# Patient Record
Sex: FEMALE | Race: BLACK OR AFRICAN AMERICAN | NOT HISPANIC OR LATINO | Employment: FULL TIME | URBAN - METROPOLITAN AREA
[De-identification: names, ages, dates, MRNs, and addresses within clinical notes are randomized per-mention and may not be internally consistent; named-entity substitution may affect disease eponyms.]

---

## 2023-07-08 ENCOUNTER — OFFICE VISIT (OUTPATIENT)
Dept: URGENT CARE | Facility: URGENT CARE | Age: 33
End: 2023-07-08
Payer: COMMERCIAL

## 2023-07-08 VITALS
OXYGEN SATURATION: 98 % | DIASTOLIC BLOOD PRESSURE: 71 MMHG | RESPIRATION RATE: 12 BRPM | HEART RATE: 85 BPM | TEMPERATURE: 98.3 F | SYSTOLIC BLOOD PRESSURE: 106 MMHG | WEIGHT: 184.4 LBS | BODY MASS INDEX: 33.19 KG/M2

## 2023-07-08 DIAGNOSIS — M89.8X8 MASS OF SKULL: ICD-10-CM

## 2023-07-08 DIAGNOSIS — J02.9 ACUTE PHARYNGITIS, UNSPECIFIED ETIOLOGY: Primary | ICD-10-CM

## 2023-07-08 DIAGNOSIS — J30.2 SEASONAL ALLERGIC RHINITIS, UNSPECIFIED TRIGGER: ICD-10-CM

## 2023-07-08 LAB
DEPRECATED S PYO AG THROAT QL EIA: NEGATIVE
GROUP A STREP BY PCR: NOT DETECTED

## 2023-07-08 PROCEDURE — 87651 STREP A DNA AMP PROBE: CPT | Performed by: PHYSICIAN ASSISTANT

## 2023-07-08 PROCEDURE — 99203 OFFICE O/P NEW LOW 30 MIN: CPT | Performed by: PHYSICIAN ASSISTANT

## 2023-07-08 RX ORDER — CETIRIZINE HYDROCHLORIDE 10 MG/1
10 TABLET ORAL DAILY
Qty: 90 TABLET | Refills: 0 | Status: SHIPPED | OUTPATIENT
Start: 2023-07-08

## 2023-07-08 ASSESSMENT — PAIN SCALES - GENERAL: PAINLEVEL: NO PAIN (0)

## 2023-07-08 NOTE — PROGRESS NOTES
Assessment & Plan     Acute pharyngitis, unspecified etiology  - Streptococcus A Rapid Screen w/Reflex to PCR - Clinic Collect  - Group A Streptococcus PCR Throat Swab    Seasonal allergic rhinitis, unspecified trigger  - cetirizine (ZYRTEC) 10 MG tablet; Take 1 tablet (10 mg) by mouth daily    Mass of skull  This is felt to be a bony mass, possibly a normal variation of the skull. I recommend follow up with primary care provider for further evaluation to consider imaging given recent tenderness in these masses.    Return in about 1 week (around 7/15/2023) for visit with primary care provider if not improving.     Yohana Peterson PA-C  [facility]    Subjective   Teresa Ray is a 33 year old who presents for the following health issues     Patient presents with:  Pharyngitis: On Monday, sore throat, headache, slight cough, having wheezing, some lumps on the top of the head    JUANITO Moore presents clinic today for evaluation of a sore throat and a cough.  Symptoms first began 5 days ago.  The sore throat has been improving as she has been drinking hot tea.  Cough began yesterday and feels that it is coming from a tickle in her throat versus deep in her chest.  No fever.    She also notes that she has lumps on her head.  These have been present for several years but are now becoming tender to the touch.  She states that sometimes she gets a headache and presses on these lumps and the pain improves.    Review of Systems   ROS negative except as stated above.      Objective    /71   Pulse 85   Temp 98.3  F (36.8  C) (Tympanic)   Resp 12   Wt 83.6 kg (184 lb 6.4 oz)   LMP 07/04/2023   SpO2 98%   BMI 33.19 kg/m    Physical Exam   GENERAL: healthy, alert and no distress  EYES: Eyes grossly normal to inspection, PERRL and conjunctivae and sclerae normal  HENT: ear canals and TM's normal, nose with slightly edematous and erythematous mucosa bilaterally, and mouth without ulcers or  lesions, posterior pharynx erythematous with 3+ tonsillar hypertrophy suspected to be baseline enlargement  NECK: no adenopathy, no asymmetry, masses, or scars and thyroid normal to palpation  RESP: lungs clear to auscultation - no rales, rhonchi or wheezes  CV: regular rate and rhythm, normal S1 S2, no S3 or S4, no murmur, click or rub, no peripheral edema and peripheral pulses strong  SKIN: no suspicious lesions or rashes. Hard subcutaneous lump noted on scalp. Non mobile, smooth.    Results for orders placed or performed in visit on 07/08/23   Streptococcus A Rapid Screen w/Reflex to PCR - Clinic Collect     Status: Normal    Specimen: Throat; Swab   Result Value Ref Range    Group A Strep antigen Negative Negative

## 2023-07-08 NOTE — PATIENT INSTRUCTIONS
"Rapid strep negative.  Take an antihistamine such as Claritin (loratadine), Zyrtec (cetirizine) or Allegra (fexofenadine) daily for allergy symptoms.  Drink plenty of fluids and rest.  May use salt water gargles- about 8 oz warm water with about 1 teaspoon salt  Over the counter pain relievers such as Tylenol or ibuprofen may be used as needed.   Honey lemon tea helps to soothe the throat. \"Throat Coat\" tea is soothing as well.  Please follow up with primary care provider if not improving, worsening or new symptoms.    "

## 2025-02-18 ENCOUNTER — VIRTUAL VISIT (OUTPATIENT)
Dept: UROLOGY | Facility: CLINIC | Age: 35
End: 2025-02-18
Attending: OBSTETRICS & GYNECOLOGY
Payer: COMMERCIAL

## 2025-02-18 VITALS — HEIGHT: 63 IN | WEIGHT: 181 LBS | BODY MASS INDEX: 32.07 KG/M2

## 2025-02-18 DIAGNOSIS — N39.41 URGE INCONTINENCE: Primary | ICD-10-CM

## 2025-02-18 RX ORDER — TERBINAFINE HYDROCHLORIDE 250 MG/1
1 TABLET ORAL DAILY
COMMUNITY

## 2025-02-18 ASSESSMENT — PAIN SCALES - GENERAL: PAINLEVEL_OUTOF10: NO PAIN (0)

## 2025-02-18 NOTE — PROGRESS NOTES
"Virtual Visit Details    Type of service:  Video Visit   Video Start Time: 4:39 PM  Video End Time: 5:05 pm    Originating Location (pt. Location): Home    Distant Location (provider location):  On-site  Platform used for Video Visit: [place]        Chief complaint: urge incontinence    Subjective     Teresa Ray is a 34 year old  who presents for a video visit today for urge urinary incontinence. She says it started after she had a short period of difficulty emptying her bladder for several hours back in October after she was having some severe upper respiratory tract infection vs spasms with shortness of breath ( needed nebulizer/inhalors). She was evaluated in the ED back in the  of  for upper respiratory symptoms and possible urinary retention. Abdominal US was done on one of her ED visits on 10.28.24 which showed distended bladder but I am unable to tell if this was after attempting to void and per ED notes \"you are able to empty your bladder\".  Pelvic US on 10.19.24 appeared normal.  She says she was able to void after several hours in the ED and has not had any issues since.  However now, she  has has been having small leaks with urgency occasionally. She voids every 2-3 hours during the day and none at night. No dysurea or gross hematuria. No stress incontinence    Pelvic US 10.19.24    FINDINGS:    Uterus: Measures 9.2 x 6.0 x 6.5 cm. Uterine Volume: 186.58 ml. No evidence of a fibroid or other focal uterine lesion.   Endometrium: Measures up to 0.6 cm in thickness.    Right Ovary: Measures 4.3 x 1.8 x 3.7 cm and appears unremarkable   Right Ovary Blood Flow: Present.   Left Ovary: Measures 2.6 x 1.4 x 2.7 cm and appears unremarkable   Left Ovary Blood Flow: Present.   Free Fluid: no significant free fluid.       Prolapse symptoms  Denies vaginal bulge, pressure sensation or protrusion.      GI symptoms  Denies chronic diarrhea, constipation. Denies bothersome fecal or flatal incontinence. " Denies defecatory difficulties.       Sexual health/Pelvic floor  Sexually active. No pain . Regular menses    Relevant Medical History:    Diabetes? no  High Blood pressure? no     Recurrent UTIs? no  Sleep Apnea? no  Obesity? Body mass index is 32.06 kg/m .  History of Blood clots? no  Other medical problems: none    Surgical History:      Past Surgical History:   Procedure Laterality Date    GYN SURGERY         OB/Gyn History:  OB History   No obstetric history on file.       Medications/Vitamins/Supplements:   Current Outpatient Medications   Medication Sig Dispense Refill    cetirizine (ZYRTEC) 10 MG tablet Take 1 tablet (10 mg) by mouth daily (Patient not taking: Reported on 2/18/2025) 90 tablet 0    terbinafine (LAMISIL) 250 MG tablet Take 1 tablet by mouth daily.       No current facility-administered medications for this visit.         Medical History:      No past medical history on file.  ROS  Social History    Social History     Socioeconomic History    Marital status: Single     Spouse name: Not on file    Number of children: Not on file    Years of education: Not on file    Highest education level: Not on file   Occupational History    Not on file   Tobacco Use    Smoking status: Never    Smokeless tobacco: Never   Vaping Use    Vaping status: Never Used   Substance and Sexual Activity    Alcohol use: No    Drug use: No    Sexual activity: Yes     Partners: Male   Other Topics Concern    Parent/sibling w/ CABG, MI or angioplasty before 65F 55M? Not Asked   Social History Narrative    Not on file     Social Drivers of Health     Financial Resource Strain: Not on file   Food Insecurity: Not on file   Transportation Needs: Not on file   Physical Activity: Not on file   Stress: Not on file   Social Connections: Not on file   Interpersonal Safety: Not At Risk (10/28/2024)    Received from [facility]     Humiliation, Afraid, Rape, and Kick questionnaire     Fear of Current or Ex-Partner: No      Emotionally Abused: No     Physically Abused: No     Sexually Abused: No   Housing Stability: Not on file       Family History  No family history on file.    Allergy    Allergies   Allergen Reactions    Wound Dressing Adhesive Dermatitis     Tape on post-op dressing       Current Outpatient Medications   Medication Sig Dispense Refill    cetirizine (ZYRTEC) 10 MG tablet Take 1 tablet (10 mg) by mouth daily 90 tablet 0     No current facility-administered medications for this visit.       Objective:   Deferred ( virtual visit)     Asssessment and plan  Teresa was seen today for consult.    Diagnoses and all orders for this visit:    Urge incontinence      Not having any more voiding issues. May be related to stress induced poorly relaxing pelvic floor while she was dealing with her Upper respiratory symptoms vs reaction to her meds she was taking. Hard to say but was temporary.   Will bring her in for pelvic exam to rule out other issues such as levator myalgia/hypertonic pelvic floor/prolapse. Will confirm voiding function  Not taking any bladder irritants which is good. May benefit from pelvic PT depending on exam           I spent a total of 30 minutes on  Video with  Teresa Ray on the date of the encounter in chart review, patient visit, review of tests, documentation and/or discussion with other providers about the issues documented above.

## 2025-02-18 NOTE — LETTER
"2025       RE: Teresa Ray  [address]  [place] [address]     Dear Colleague,    Thank you for referring your patient, Teresa Ray, to the [facility] [place] [facility]. Please see a copy of my visit note below.    Virtual Visit Details    Type of service:  Video Visit   Video Start Time: 4:39 PM  Video End Time: 5:05 pm    Originating Location (pt. Location): Home    Distant Location (provider location):  On-site  Platform used for Video Visit: [facility]        Chief complaint: urge incontinence    Subjective     Teresa Ray is a 34 year old  who presents for a video visit today for urge urinary incontinence. She says it started after she had a short period of difficulty emptying her bladder for several hours back in October after she was having some severe upper respiratory tract infection vs spasms with shortness of breath ( needed nebulizer/inhalors). She was evaluated in the ED back in the  of  for upper respiratory symptoms and possible urinary retention. Abdominal US was done on one of her ED visits on 10.28.24 which showed distended bladder but I am unable to tell if this was after attempting to void and per ED notes \"you are able to empty your bladder\".  Pelvic US on 10.19.24 appeared normal.  She says she was able to void after several hours in the ED and has not had any issues since.  However now, she  has has been having small leaks with urgency occasionally. She voids every 2-3 hours during the day and none at night. No dysurea or gross hematuria. No stress incontinence    Pelvic US 10.19.24    FINDINGS:    Uterus: Measures 9.2 x 6.0 x 6.5 cm. Uterine Volume: 186.58 ml. No evidence of a fibroid or other focal uterine lesion.   Endometrium: Measures up to 0.6 cm in thickness.    Right Ovary: Measures 4.3 x 1.8 x 3.7 cm and appears unremarkable   Right Ovary Blood Flow: Present.   Left Ovary: " Measures 2.6 x 1.4 x 2.7 cm and appears unremarkable   Left Ovary Blood Flow: Present.   Free Fluid: no significant free fluid.       Prolapse symptoms  Denies vaginal bulge, pressure sensation or protrusion.      GI symptoms  Denies chronic diarrhea, constipation. Denies bothersome fecal or flatal incontinence. Denies defecatory difficulties.       Sexual health/Pelvic floor  Sexually active. No pain . Regular menses    Relevant Medical History:    Diabetes? no  High Blood pressure? no     Recurrent UTIs? no  Sleep Apnea? no  Obesity? Body mass index is 32.06 kg/m .  History of Blood clots? no  Other medical problems: none    Surgical History:      Past Surgical History:   Procedure Laterality Date     GYN SURGERY         OB/Gyn History:  OB History   No obstetric history on file.       Medications/Vitamins/Supplements:   Current Outpatient Medications   Medication Sig Dispense Refill     cetirizine (ZYRTEC) 10 MG tablet Take 1 tablet (10 mg) by mouth daily (Patient not taking: Reported on 2/18/2025) 90 tablet 0     terbinafine (LAMISIL) 250 MG tablet Take 1 tablet by mouth daily.       No current facility-administered medications for this visit.         Medical History:      No past medical history on file.  ROS  Social History    Social History     Socioeconomic History     Marital status: Single     Spouse name: Not on file     Number of children: Not on file     Years of education: Not on file     Highest education level: Not on file   Occupational History     Not on file   Tobacco Use     Smoking status: Never     Smokeless tobacco: Never   Vaping Use     Vaping status: Never Used   Substance and Sexual Activity     Alcohol use: No     Drug use: No     Sexual activity: Yes     Partners: Male   Other Topics Concern     Parent/sibling w/ CABG, MI or angioplasty before 65F 55M? Not Asked   Social History Narrative     Not on file     Social Drivers of Health     Financial Resource Strain: Not on file   Food  Insecurity: Not on file   Transportation Needs: Not on file   Physical Activity: Not on file   Stress: Not on file   Social Connections: Not on file   Interpersonal Safety: Not At Risk (10/28/2024)    Received from [facility]     Humiliation, Afraid, Rape, and Kick questionnaire      Fear of Current or Ex-Partner: No      Emotionally Abused: No      Physically Abused: No      Sexually Abused: No   Housing Stability: Not on file       Family History  No family history on file.    Allergy    Allergies   Allergen Reactions     Wound Dressing Adhesive Dermatitis     Tape on post-op dressing       Current Outpatient Medications   Medication Sig Dispense Refill     cetirizine (ZYRTEC) 10 MG tablet Take 1 tablet (10 mg) by mouth daily 90 tablet 0     No current facility-administered medications for this visit.       Objective:   Deferred ( virtual visit)     Asssessment and plan  Teresa was seen today for consult.    Diagnoses and all orders for this visit:    Urge incontinence      Not having any more voiding issues. May be related to stress induced poorly relaxing pelvic floor while she was dealing with her Upper respiratory symptoms vs reaction to her meds she was taking. Hard to say but was temporary.   Will bring her in for pelvic exam to rule out other issues such as levator myalgia/hypertonic pelvic floor/prolapse. Will confirm voiding function  Not taking any bladder irritants which is good. May benefit from pelvic PT depending on exam           I spent a total of 30 minutes on  Video with  Teresa Ray on the date of the encounter in chart review, patient visit, review of tests, documentation and/or discussion with other providers about the issues documented above.       Again, thank you for allowing me to participate in the care of your patient.      Sincerely,    Shantell Maynard MD

## 2025-02-18 NOTE — NURSING NOTE
Current patient location: [address] [place] [id number]    Is the patient currently in the state of [place]? YES    Visit mode: VIDEO    If the visit is dropped, the patient can be reconnected by:VIDEO VISIT: Text to cell phone:   Telephone Information:   Mobile [phone]       Will anyone else be joining the visit? NO  (If patient encounters technical issues they should call [phone])    Are changes needed to the allergy or medication list? No    Are refills needed on medications prescribed by this physician? NO    Rooming Documentation:  Patient declined to complete questionnaire(s)    Reason for visit: Consult    Tamiko JUSTICE